# Patient Record
Sex: FEMALE | Race: BLACK OR AFRICAN AMERICAN | NOT HISPANIC OR LATINO | Employment: UNEMPLOYED | URBAN - METROPOLITAN AREA
[De-identification: names, ages, dates, MRNs, and addresses within clinical notes are randomized per-mention and may not be internally consistent; named-entity substitution may affect disease eponyms.]

---

## 2018-05-31 ENCOUNTER — TRANSFERRED RECORDS (OUTPATIENT)
Dept: HEALTH INFORMATION MANAGEMENT | Facility: CLINIC | Age: 33
End: 2018-05-31

## 2018-05-31 LAB
HBV SURFACE AG SERPL QL IA: NEGATIVE
RUBELLA ABY IGG: NORMAL

## 2018-10-01 LAB — GROUP B STREP PCR: NEGATIVE

## 2018-12-07 LAB — GROUP B STREP PCR: NEGATIVE

## 2018-12-27 ENCOUNTER — HOSPITAL ENCOUNTER (INPATIENT)
Facility: CLINIC | Age: 33
LOS: 2 days | Discharge: HOME | End: 2018-12-29
Attending: OBSTETRICS & GYNECOLOGY | Admitting: INTERNAL MEDICINE
Payer: COMMERCIAL

## 2018-12-27 LAB
ABO + RH BLD: NORMAL
ABO + RH BLD: NORMAL
AMPHETAMINES UR QL SCN: NEGATIVE
CANNABINOIDS UR QL: NEGATIVE
COCAINE UR QL: NEGATIVE
OPIATES UR QL SCN: NEGATIVE
PCP UR QL SCN: NEGATIVE
SPECIMEN EXP DATE BLD: NORMAL
T PALLIDUM AB SER QL: NONREACTIVE

## 2018-12-27 PROCEDURE — 86901 BLOOD TYPING SEROLOGIC RH(D): CPT | Performed by: OBSTETRICS & GYNECOLOGY

## 2018-12-27 PROCEDURE — 86780 TREPONEMA PALLIDUM: CPT | Performed by: OBSTETRICS & GYNECOLOGY

## 2018-12-27 PROCEDURE — 0KQM0ZZ REPAIR PERINEUM MUSCLE, OPEN APPROACH: ICD-10-PCS | Performed by: OBSTETRICS & GYNECOLOGY

## 2018-12-27 PROCEDURE — 25000132 ZZH RX MED GY IP 250 OP 250 PS 637: Performed by: OBSTETRICS & GYNECOLOGY

## 2018-12-27 PROCEDURE — 36415 COLL VENOUS BLD VENIPUNCTURE: CPT | Performed by: OBSTETRICS & GYNECOLOGY

## 2018-12-27 PROCEDURE — G0463 HOSPITAL OUTPT CLINIC VISIT: HCPCS

## 2018-12-27 PROCEDURE — 12000037 ZZH R&B POSTPARTUM INTERMEDIATE

## 2018-12-27 PROCEDURE — 10907ZC DRAINAGE OF AMNIOTIC FLUID, THERAPEUTIC FROM PRODUCTS OF CONCEPTION, VIA NATURAL OR ARTIFICIAL OPENING: ICD-10-PCS | Performed by: OBSTETRICS & GYNECOLOGY

## 2018-12-27 PROCEDURE — 80307 DRUG TEST PRSMV CHEM ANLYZR: CPT | Performed by: OBSTETRICS & GYNECOLOGY

## 2018-12-27 PROCEDURE — 86900 BLOOD TYPING SEROLOGIC ABO: CPT | Performed by: OBSTETRICS & GYNECOLOGY

## 2018-12-27 PROCEDURE — 25000128 H RX IP 250 OP 636

## 2018-12-27 PROCEDURE — 72200001 ZZH LABOR CARE VAGINAL DELIVERY SINGLE

## 2018-12-27 RX ORDER — OXYCODONE AND ACETAMINOPHEN 5; 325 MG/1; MG/1
1 TABLET ORAL
Status: DISCONTINUED | OUTPATIENT
Start: 2018-12-27 | End: 2018-12-29 | Stop reason: HOSPADM

## 2018-12-27 RX ORDER — CARBOPROST TROMETHAMINE 250 UG/ML
250 INJECTION, SOLUTION INTRAMUSCULAR
Status: DISCONTINUED | OUTPATIENT
Start: 2018-12-27 | End: 2018-12-29 | Stop reason: HOSPADM

## 2018-12-27 RX ORDER — OXYTOCIN/0.9 % SODIUM CHLORIDE 30/500 ML
100-340 PLASTIC BAG, INJECTION (ML) INTRAVENOUS CONTINUOUS PRN
Status: DISCONTINUED | OUTPATIENT
Start: 2018-12-27 | End: 2018-12-29 | Stop reason: HOSPADM

## 2018-12-27 RX ORDER — AMOXICILLIN 250 MG
2 CAPSULE ORAL 2 TIMES DAILY
Status: DISCONTINUED | OUTPATIENT
Start: 2018-12-27 | End: 2018-12-29 | Stop reason: HOSPADM

## 2018-12-27 RX ORDER — PRENATAL VIT/IRON FUM/FOLIC AC 27MG-0.8MG
1 TABLET ORAL DAILY
COMMUNITY

## 2018-12-27 RX ORDER — ACETAMINOPHEN 325 MG/1
650 TABLET ORAL EVERY 4 HOURS PRN
Status: DISCONTINUED | OUTPATIENT
Start: 2018-12-27 | End: 2018-12-29 | Stop reason: HOSPADM

## 2018-12-27 RX ORDER — METHYLERGONOVINE MALEATE 0.2 MG/ML
200 INJECTION INTRAVENOUS
Status: DISCONTINUED | OUTPATIENT
Start: 2018-12-27 | End: 2018-12-29 | Stop reason: HOSPADM

## 2018-12-27 RX ORDER — AMOXICILLIN 250 MG
1 CAPSULE ORAL 2 TIMES DAILY
Status: DISCONTINUED | OUTPATIENT
Start: 2018-12-27 | End: 2018-12-29 | Stop reason: HOSPADM

## 2018-12-27 RX ORDER — ONDANSETRON 2 MG/ML
4 INJECTION INTRAMUSCULAR; INTRAVENOUS EVERY 6 HOURS PRN
Status: DISCONTINUED | OUTPATIENT
Start: 2018-12-27 | End: 2018-12-29 | Stop reason: HOSPADM

## 2018-12-27 RX ORDER — IBUPROFEN 400 MG/1
800 TABLET, FILM COATED ORAL
Status: DISCONTINUED | OUTPATIENT
Start: 2018-12-27 | End: 2018-12-29 | Stop reason: HOSPADM

## 2018-12-27 RX ORDER — OXYTOCIN 10 [USP'U]/ML
10 INJECTION, SOLUTION INTRAMUSCULAR; INTRAVENOUS
Status: COMPLETED | OUTPATIENT
Start: 2018-12-27 | End: 2018-12-27

## 2018-12-27 RX ORDER — LIDOCAINE HYDROCHLORIDE 10 MG/ML
INJECTION, SOLUTION INFILTRATION; PERINEURAL
Status: DISCONTINUED
Start: 2018-12-27 | End: 2018-12-27 | Stop reason: HOSPADM

## 2018-12-27 RX ORDER — OXYTOCIN 10 [USP'U]/ML
10 INJECTION, SOLUTION INTRAMUSCULAR; INTRAVENOUS
Status: DISCONTINUED | OUTPATIENT
Start: 2018-12-27 | End: 2018-12-29 | Stop reason: HOSPADM

## 2018-12-27 RX ORDER — OXYTOCIN 10 [USP'U]/ML
INJECTION, SOLUTION INTRAMUSCULAR; INTRAVENOUS
Status: COMPLETED
Start: 2018-12-27 | End: 2018-12-27

## 2018-12-27 RX ORDER — BISACODYL 10 MG
10 SUPPOSITORY, RECTAL RECTAL DAILY PRN
Status: DISCONTINUED | OUTPATIENT
Start: 2018-12-29 | End: 2018-12-29 | Stop reason: HOSPADM

## 2018-12-27 RX ORDER — NALOXONE HYDROCHLORIDE 0.4 MG/ML
.1-.4 INJECTION, SOLUTION INTRAMUSCULAR; INTRAVENOUS; SUBCUTANEOUS
Status: DISCONTINUED | OUTPATIENT
Start: 2018-12-27 | End: 2018-12-29 | Stop reason: HOSPADM

## 2018-12-27 RX ORDER — HYDROCORTISONE 2.5 %
CREAM (GRAM) TOPICAL 3 TIMES DAILY PRN
Status: DISCONTINUED | OUTPATIENT
Start: 2018-12-27 | End: 2018-12-29 | Stop reason: HOSPADM

## 2018-12-27 RX ORDER — OXYTOCIN/0.9 % SODIUM CHLORIDE 30/500 ML
100 PLASTIC BAG, INJECTION (ML) INTRAVENOUS CONTINUOUS
Status: DISCONTINUED | OUTPATIENT
Start: 2018-12-27 | End: 2018-12-29 | Stop reason: HOSPADM

## 2018-12-27 RX ORDER — LANOLIN 100 %
OINTMENT (GRAM) TOPICAL
Status: DISCONTINUED | OUTPATIENT
Start: 2018-12-27 | End: 2018-12-29 | Stop reason: HOSPADM

## 2018-12-27 RX ORDER — FENTANYL CITRATE 50 UG/ML
50-100 INJECTION, SOLUTION INTRAMUSCULAR; INTRAVENOUS
Status: DISCONTINUED | OUTPATIENT
Start: 2018-12-27 | End: 2018-12-29 | Stop reason: HOSPADM

## 2018-12-27 RX ORDER — OXYTOCIN/0.9 % SODIUM CHLORIDE 30/500 ML
340 PLASTIC BAG, INJECTION (ML) INTRAVENOUS CONTINUOUS PRN
Status: DISCONTINUED | OUTPATIENT
Start: 2018-12-27 | End: 2018-12-29 | Stop reason: HOSPADM

## 2018-12-27 RX ORDER — SODIUM CHLORIDE, SODIUM LACTATE, POTASSIUM CHLORIDE, CALCIUM CHLORIDE 600; 310; 30; 20 MG/100ML; MG/100ML; MG/100ML; MG/100ML
INJECTION, SOLUTION INTRAVENOUS CONTINUOUS
Status: DISCONTINUED | OUTPATIENT
Start: 2018-12-27 | End: 2018-12-29 | Stop reason: HOSPADM

## 2018-12-27 RX ORDER — IBUPROFEN 400 MG/1
800 TABLET, FILM COATED ORAL EVERY 6 HOURS PRN
Status: DISCONTINUED | OUTPATIENT
Start: 2018-12-27 | End: 2018-12-29 | Stop reason: HOSPADM

## 2018-12-27 RX ORDER — OXYTOCIN/0.9 % SODIUM CHLORIDE 30/500 ML
PLASTIC BAG, INJECTION (ML) INTRAVENOUS
Status: DISCONTINUED
Start: 2018-12-27 | End: 2018-12-27 | Stop reason: WASHOUT

## 2018-12-27 RX ADMIN — IBUPROFEN 800 MG: 400 TABLET ORAL at 21:54

## 2018-12-27 RX ADMIN — ACETAMINOPHEN 650 MG: 325 TABLET, FILM COATED ORAL at 21:54

## 2018-12-27 RX ADMIN — ACETAMINOPHEN 650 MG: 325 TABLET, FILM COATED ORAL at 08:53

## 2018-12-27 RX ADMIN — IBUPROFEN 800 MG: 400 TABLET ORAL at 08:53

## 2018-12-27 RX ADMIN — OXYTOCIN 10 UNITS: 10 INJECTION, SOLUTION INTRAMUSCULAR; INTRAVENOUS at 06:57

## 2018-12-27 RX ADMIN — ACETAMINOPHEN 650 MG: 325 TABLET, FILM COATED ORAL at 15:16

## 2018-12-27 RX ADMIN — IBUPROFEN 800 MG: 400 TABLET ORAL at 15:16

## 2018-12-27 RX ADMIN — SENNOSIDES AND DOCUSATE SODIUM 1 TABLET: 8.6; 5 TABLET ORAL at 21:54

## 2018-12-27 SDOH — HEALTH STABILITY: MENTAL HEALTH: HOW OFTEN DO YOU HAVE A DRINK CONTAINING ALCOHOL?: NEVER

## 2018-12-27 NOTE — LACTATION NOTE
Initial Lactation visit.  Recommend unlimited, frequent breast feedings: At least 8 - 12 times every 24 hours. Avoid pacifiers and supplementation with formula unless medically indicated. Explained benefits of holding baby skin on skin to help promote better breastfeeding outcomes.   Infant fed well after delivery.  Sleepy this afternoon.  Encouraged Hazel to have RN or lactation assess latch when infant is feeding.  She did not breast feed her older son.    Discussed normal feeding patterns and cluster feeding.    Will revisit as needed.    Nia Stock RN, IBCLC

## 2018-12-27 NOTE — PROCEDURES
IN HOUSE OB NOTE, DELIVERY      I was called urgently to room 219. A multiparous patient who was planning to deliver at [facility] had arrived, complete and pushy.    I examined the patient, membranes were intact, vtx 0 station, membranes plus 3.    She reported her due date was today.  This was her second child, last on 11 yrs ago.  Believes her GBS was negative.    She was very pushy, category 1 tracing    She was prepped and draped in usual manner.  Amniotomy was done.  She pushed with 2 contractions and delivered OA, male, wt pending.  Apgar 9 @ 1min, 9 @ 5 min.    Placenta delivered spontaneous intact.  Given 10 unit(s) of pitocin IM. Good uterine tone.    Bilateral second degree labial lacerations, infiltrated 1% lidocaine, repaired 3-0 chromic.    EBL: 100 ml    Mother and infant doing well.  Dr. Peñaloza aware of delivery, her group is on for unassigned patients, she will have one of her partners come see patient this AM.

## 2018-12-27 NOTE — PLAN OF CARE
Data: Hazel Kitchen transferred to 414 via wheelchair at 1020. Baby transferred via parent's arms.  Action: Receiving unit notified of transfer: Yes. Patient and family notified of room change. Report given to Krysten ROSENBERG RN at 1030. Belongings sent to receiving unit. Accompanied by Registered Nurse. Oriented patient to surroundings. Call light within reach. ID bands double-checked with receiving RN.  Response: Patient tolerated transfer and is stable.

## 2018-12-27 NOTE — H&P
Admission H and P:    S: Pt was see at [facility] for spontaneous labor remote from [facility]. Upon arrival she was found to be completely dilated and delivered with in house OB.     Medical History:  1. Borderline personality  2. Depression  3. Gestational HTN in prior pregnancy  4. Asthma  5. HSV type 1  6. Migraine HA    Surgical History  1. Vaginal delivery    Family History:   Non-contributory    Social History:  Partnered (new FOB), no significant drug, EtOH, Tobacco     Medications: See chart    O:  /82   Pulse 87   Temp 98.5  F (36.9  C) (Temporal)   Resp 16   Breastfeeding? Unknown   Abdomen: fundus firm 2 fingers below the umbilicus.   Extremities: no cords, tenderness, erythema or edema.     A/P: 32 yo  postpartum from precipitous delivery.   1. Routine postpartum care.   2. Return to primary OB for postpartum follow-up after discharge.     Kailey Polanco

## 2018-12-27 NOTE — PLAN OF CARE
Patient transferred to [facility] about 1030,oriented to room,call light,safety issues reviewed,voiding well,vss,pain control with tylenol&ibuprofen,working on breast feeding.

## 2018-12-27 NOTE — PROGRESS NOTES
Precipitous delivery at 0653.  Viable baby boy.  Postpartum cares started.  Report to Jez MARINO RN.

## 2018-12-27 NOTE — PLAN OF CARE
Pt to [facility] for labor eval. Plan was to deliver at [facility] but pt did not think she would make it there. Assisted pt to remove pants while in bed. Cervix 10/100/0. Intact with bulging bag. FHT's 135. Pt moved to room 219 via bed. Dr JOHN Peñaloza MD for unassigned pts, notified to come in. Dr Frye, in-house MD, called to room for imminent delivery. NNP called to room for precipitous delivery with no prenatal record. SHELTON Bird RN assumed care.

## 2018-12-28 PROCEDURE — 12000037 ZZH R&B POSTPARTUM INTERMEDIATE

## 2018-12-28 PROCEDURE — 25000132 ZZH RX MED GY IP 250 OP 250 PS 637: Performed by: OBSTETRICS & GYNECOLOGY

## 2018-12-28 RX ADMIN — SENNOSIDES AND DOCUSATE SODIUM 1 TABLET: 8.6; 5 TABLET ORAL at 08:46

## 2018-12-28 RX ADMIN — ACETAMINOPHEN 650 MG: 325 TABLET, FILM COATED ORAL at 11:10

## 2018-12-28 RX ADMIN — ACETAMINOPHEN 650 MG: 325 TABLET, FILM COATED ORAL at 17:11

## 2018-12-28 RX ADMIN — IBUPROFEN 800 MG: 400 TABLET ORAL at 11:10

## 2018-12-28 RX ADMIN — ACETAMINOPHEN 650 MG: 325 TABLET, FILM COATED ORAL at 04:27

## 2018-12-28 RX ADMIN — IBUPROFEN 800 MG: 400 TABLET ORAL at 04:27

## 2018-12-28 RX ADMIN — SENNOSIDES AND DOCUSATE SODIUM 1 TABLET: 8.6; 5 TABLET ORAL at 21:35

## 2018-12-28 RX ADMIN — IBUPROFEN 800 MG: 400 TABLET ORAL at 17:11

## 2018-12-28 NOTE — PLAN OF CARE
Vital signs stable. Fundus firm, U/2, bleeding small, no clots. Using ice/tucks. Voiding without difficulty. Up independently in room. Taking Tylenol/ibuprofen for pain from uterine cramping. Patient reports bilateral nipple tenderness; no redness, cracks, or bleeding. Assisted with latch. Using lanolin. Requested to see lactation during day.  supportive at bedside. Will continue to monitor and notify MD as needed.

## 2018-12-28 NOTE — PLAN OF CARE
Vitals stable, pain well controlled with tylenol and ibuprofen. Fundus firm, midline, no free flow or clots. Bonding well with infant. Pt per pathway.

## 2018-12-28 NOTE — PLAN OF CARE
Vital signs stable,voiding with out difficulty,pain control with tylenol&ibuprofen,baby breast feeding ok,using nipple shield intermittently&lanolin for tender nipple.Will continue to monitor.

## 2018-12-28 NOTE — LACTATION NOTE
Follow-up visit. Mom says nipples are getting sore. Baby has a shallow, tight suckle. Reviewed with mom how to work on wider mouth and deeper suckle. Mom able to express good drops of colostrum. Tried different positions. Did achieve a respectable, comfortable latch and rhythmic suckle for about 5 minutes. Will continue to follow as needed. N Day RN IBCLC

## 2018-12-28 NOTE — LACTATION NOTE
Follow up visit.  Infant has been working on feeding.  Latching was very painful overnight.  Started a shield.  Hazel said it was still painful.  This morning her RN got baby to latch on the R side without the shield and feeding was less painful.  Infant was feeding at time of visit.  Latched well to R with occasional swallows.  Infant slipped shallow at end of feeding and it was more painful.  Encouraged Hazel to take baby off the breast and correct the latch.  He was not interested in feeding more.  She said this was the best feeding he had since yesterday.     Reviewed signs infant is getting enough. Encouraged her to call to have lactation or RN assess and assist with feeding as needed until latching is easier.    Will continue to follow.  Nia Stock  RN, IBCLC

## 2018-12-29 VITALS
TEMPERATURE: 98 F | RESPIRATION RATE: 16 BRPM | DIASTOLIC BLOOD PRESSURE: 77 MMHG | SYSTOLIC BLOOD PRESSURE: 116 MMHG | HEART RATE: 87 BPM

## 2018-12-29 PROCEDURE — 25000132 ZZH RX MED GY IP 250 OP 250 PS 637: Performed by: OBSTETRICS & GYNECOLOGY

## 2018-12-29 RX ADMIN — IBUPROFEN 800 MG: 400 TABLET ORAL at 00:28

## 2018-12-29 RX ADMIN — IBUPROFEN 800 MG: 400 TABLET ORAL at 06:58

## 2018-12-29 RX ADMIN — SENNOSIDES AND DOCUSATE SODIUM 1 TABLET: 8.6; 5 TABLET ORAL at 07:46

## 2018-12-29 RX ADMIN — ACETAMINOPHEN 650 MG: 325 TABLET, FILM COATED ORAL at 06:58

## 2018-12-29 RX ADMIN — ACETAMINOPHEN 650 MG: 325 TABLET, FILM COATED ORAL at 00:28

## 2018-12-29 NOTE — PLAN OF CARE
Vital signs stable. Patient voiding without difficulty. Able to ambulate in room free of dizziness. Taking tylenol/ibuprofen for pain management. Working on breastfeeding infant every 2-3 hours. Encouraged to call with questions/concerns. Will continue to monitor.

## 2018-12-29 NOTE — PROGRESS NOTES
December 28, 2018      SUBJECTIVE: No acute overnight events.  Mild abdominal cramping. +Lochia light.  Tolerating PO. Ambulating/urinating w/o difficulty.  Denies CP/palp/SOB/LH.    OBJECTIVE:  /71   Pulse 87   Temp 97.4  F (36.3  C) (Oral)   Resp 16   Breastfeeding? Unknown   Gen: NAD, A&O x 3  Abd: Uterus firm, contracted, NT  Ext: minimal edema BL LEs, symmetric, no CT    No results found for: HGB]    A/P: PPD#1 s/p precipitous vaginal delivery.  Doing well.  Afebrile, VSS.  - ibuprofen, tylenol prn pain  - breastfeeding  - regular diet as tolerated  - routine postpartum care  - discharge home tomorrow AM  - f/u with her primary OBGYN in 6 wks for PP visit      JAYLYN CANO MD

## 2018-12-29 NOTE — LACTATION NOTE
Follow up visit.  Infant has been feeding better.  Hazel said that feedings this morning have been the best yet.  She feels her milk is coming in, breasts are granados.  She said latching and feeding is much less painful.  She has a pump for home use.  Reviewed breast care.  Hazel had no concerns or questions.  Explained signs infant is getting enough and outpatient lactation resources if she is needing assistance when discharged.    Nia Stock  RN, IBCLC

## 2018-12-29 NOTE — PROGRESS NOTES
Doing well. Holding baby in bed.    vss afeb  Abdomen soft and nt  Fundus firm and nt    A: PPD 2 s/p precipitous delivery     Doing well    P: discharge home       Precautions reviewed       Will return to her OB in 6 weekw

## 2018-12-29 NOTE — PLAN OF CARE
Vital signs stable, afebrile, voiding well, ice and tucks to perineum prn, FFU/1 scant rubra lochia, able to rest, pain well controlled with medications, rates her pain 1/10, breast feeding  skin-to-skin on demand. Lanolin used for sore nipples.  is here and is supportive.

## 2018-12-29 NOTE — PLAN OF CARE
Doing well,vss,voiding with out difficulty,pain control with tylenol&ibuprofen,using lanolin for tender nipple,baby breast feeding better.Plan to discharge today.

## 2022-11-21 ENCOUNTER — OFFICE VISIT (OUTPATIENT)
Dept: URGENT CARE | Facility: URGENT CARE | Age: 37
End: 2022-11-21
Payer: COMMERCIAL

## 2022-11-21 VITALS
OXYGEN SATURATION: 100 % | SYSTOLIC BLOOD PRESSURE: 121 MMHG | DIASTOLIC BLOOD PRESSURE: 85 MMHG | TEMPERATURE: 97.7 F | HEART RATE: 78 BPM

## 2022-11-21 DIAGNOSIS — B96.89 ACUTE BACTERIAL BRONCHITIS: Primary | ICD-10-CM

## 2022-11-21 DIAGNOSIS — R05.9 COUGH, UNSPECIFIED TYPE: ICD-10-CM

## 2022-11-21 DIAGNOSIS — J20.8 ACUTE BACTERIAL BRONCHITIS: Primary | ICD-10-CM

## 2022-11-21 PROCEDURE — 99203 OFFICE O/P NEW LOW 30 MIN: CPT | Performed by: PHYSICIAN ASSISTANT

## 2022-11-21 RX ORDER — AZITHROMYCIN 250 MG/1
TABLET, FILM COATED ORAL
Qty: 6 TABLET | Refills: 0 | Status: SHIPPED | OUTPATIENT
Start: 2022-11-21 | End: 2022-11-26

## 2022-11-21 RX ORDER — BENZONATATE 200 MG/1
200 CAPSULE ORAL 3 TIMES DAILY PRN
Qty: 21 CAPSULE | Refills: 0 | Status: SHIPPED | OUTPATIENT
Start: 2022-11-21 | End: 2022-11-28

## 2022-11-22 NOTE — PROGRESS NOTES
Assessment & Plan     Acute bacterial bronchitis    Bronchitis is an infection of the air passages (bronchial tubes) in your lungs. It often occurs when you have a cold. This illness is contagious during the first few days and is spread through the air by coughing and sneezing, or by direct contact (touching the sick person and then touching your own eyes, nose, or mouth).  Symptoms of bronchitis include cough with mucus (phlegm) and low-grade fever. Bronchitis usually lasts 7 to 14 days. Mild cases can be treated with simple home remedies. More severe infection is treated with an antibiotic.    - azithromycin (ZITHROMAX) 250 MG tablet; Take 2 tablets (500 mg) by mouth daily for 1 day, THEN 1 tablet (250 mg) daily for 4 days.  - benzonatate (TESSALON) 200 MG capsule; Take 1 capsule (200 mg) by mouth 3 times daily as needed    Cough, unspecified type    Tessalon for coughing  OTC delsym      Review of external notes as documented elsewhere in note    At today's visit with Hazel Velascoe , we discussed results, diagnosis, medications and formulated a plan.  We also discussed red flags for immediate return to clinic/ER, as well as indications for follow up with PCP if not improved in 3 days. Patient understood and agreed to plan. Hazel Lawrencezie was discharged with stable vitals and has no further questions.       No follow-ups on file.    Hugo Merida, [facility], PA-C  [facility]   Hazel is a 37 year old, presenting for the following health issues:  Urgent Care (Cough x 8 days)      HPI   Review of Systems   CONSTITUTIONAL: NEGATIVE for fever, chills, change in weight  ENT/MOUTH: NEGATIVE for ear, mouth and throat problems  RESP:NEGATIVE for significant cough or SOB and cough-productive  CV: NEGATIVE for chest pain, palpitations or peripheral edema      Objective    /85 (BP Location: Right arm, Patient Position: Sitting, Cuff Size: Adult Regular)   Pulse  78   Temp 97.7  F (36.5  C) (Tympanic)   SpO2 100%   Breastfeeding No   There is no height or weight on file to calculate BMI.  Physical Exam   GENERAL: healthy, alert and no distress  EYES: Eyes grossly normal to inspection, PERRL and conjunctivae and sclerae normal  HENT: ear canals and TM's normal, nose and mouth without ulcers or lesions  NECK: no adenopathy, no asymmetry, masses, or scars and thyroid normal to palpation  RESP: Positive for congestion   CV: regular rate and rhythm, normal S1 S2, no S3 or S4, no murmur, click or rub, no peripheral edema and peripheral pulses strong  ABDOMEN: soft, nontender, no hepatosplenomegaly, no masses and bowel sounds normal  MS: no gross musculoskeletal defects noted, no edema  SKIN: no suspicious lesions or rashes  NEURO: Normal strength and tone, mentation intact and speech normal  PSYCH: mentation appears normal, affect normal/bright